# Patient Record
(demographics unavailable — no encounter records)

---

## 2024-11-08 NOTE — HISTORY OF PRESENT ILLNESS
[FreeTextEntry1] : Annual physical [de-identified] : 77 y/o female with h/o uterine cancer, h/o thyroid cancer s/p thyroidectomy with resultant hypothyroidism, TMJ requiring jaw surgery, hearing loss in left ear after jaw surgery and osteopenia who presents for an annual physical. She is selling her house and buying a coop in [place]. She had recent Moh's surgery on her nose. Her BP has been high. She was started on amlodipine 5 mg by her cardiologist Using a stoool softener now. It has been helpful She sees psychiatry a few times per year for anxiety/depression.  Sees GYN Onc yearly mammo Jan 24.. sees Breast specialist yearly Dexa 9/23 Osteopenia chair [facility]ante [facility]es

## 2024-11-08 NOTE — REVIEW OF SYSTEMS
[Recent Change In Weight] : ~T recent weight change [Insomnia] : insomnia [Anxiety] : anxiety [Depression] : depression [Negative] : Heme/Lymph [Fever] : no fever [Chills] : no chills [Night Sweats] : no night sweats

## 2024-11-08 NOTE — HEALTH RISK ASSESSMENT
[No] : No [No falls in past year] : Patient reported no falls in the past year [0] : 2) Feeling down, depressed, or hopeless: Not at all (0) [PHQ-2 Negative - No further assessment needed] : PHQ-2 Negative - No further assessment needed [Never] : Never [Retired] : retired [] :  [# Of Children ___] : has [unfilled] children [[id number]] : 0 [Reports changes in hearing] : Reports no changes in hearing [Reports changes in vision] : Reports no changes in vision [Reports changes in dental health] : Reports no changes in dental health

## 2024-11-08 NOTE — PLAN
[FreeTextEntry1] : Hypertension BP Is elevated on amlodipine. He cardiologist manages her medication. She will call him to go in sooner than her usual appointment.   Constipation  Improved with colace Miralax as needed  Hypothyroidism Check TFTs Managed by endocrinology  Depression/Anxiety/Insomnia Follow up with psychiatry continue wellbutrin  Health maintenance Depression screening negative  Check labs today, including CBC, CMP, TSH, HbA1c, lipids. Blood collected in office.  mammo UTD Prevnar 20 administered in left deltoid. Allergies and risks/benefits reviewed with patient prior. No immediate reaction.

## 2025-06-24 NOTE — HISTORY OF PRESENT ILLNESS
[FreeTextEntry1] : H/o NPPCS, non polyposis cancer syndrome. Last colonoscopy 6/2022  H/o uterine cancer caught early No chemo or RT. also h/o thyroid cancer.  S/p left jaw replaced. c/o change in bowel habits with constipation-  passed away 2 years  ago- Brain cancer  last colonoscopy 2024 small adenoma  Feels she has prolapsed hemorrhoids used preparation H without relief and witch hazel pada

## 2025-07-11 NOTE — PHYSICAL EXAM
[Abnormal] : Examination of vagina: Abnormal [Absent] : Adnexa(ae): Absent [MA] : MA [FreeTextEntry2] : Cristal FREGOSO  [Normal] : Recto-Vaginal Exam: Normal [de-identified] : 1+ edema [de-identified] : Bruno sites intact [de-identified] : Cystocele, atrophy [de-identified] : Flavia

## 2025-07-11 NOTE — HISTORY OF PRESENT ILLNESS
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 Pires PCP: Dr. Do GYN: Dr. Pires GI: Dr. Chavez Breast Specialist: Dr Cheryl Dominguez (was Dr. Cooley) Endocrinologist: Dr. Azul [place] - Dr. Lavon Sanon  She RTO feeling well. No VB, VD, or pain.    ROS:  concerns. Cystocele-u feg no UI, pp.  (IHC: Loss of nuclear expression - MLH1, PMS2. D/W pt. She reports deciding not to pursue a genetics evaluation. We have disc the personal, familial, and generational impact of the consult. She reported that she has the genetics counselor's card still.)  HM DEXA- Sept 2023- Osteopenia. Disc Ca and Vit D suppl. disc.  D/W Pt.  BHM: She reports evals are UTD. Colonoscopy- 2024. Report and Path reviewed. D/W pt.

## 2025-07-11 NOTE — DISCUSSION/SUMMARY
[Reviewed Clinical Lab Test(s)] : Results of clinical tests were reviewed. [Reviewed Radiology Report(s)] : Radiology reports were reviewed. 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 -We discussed her surveillance and eval. -We have discussed the IHC from the path and the implications for the evaluation of a MMR mutation. She has declined genetics evaluation.   -BHM was reviewed. -We have discussed the implications of her cystocele and possible options; she again declines further evaluation.  -Her instructions were reviewed. -All Q/A. -She'll RTO in 12m, sooner prn.  -Effort for the visit includes the note prep, review of prior material, interview, exam, further documentation, and coordination of care.